# Patient Record
Sex: FEMALE | URBAN - METROPOLITAN AREA
[De-identification: names, ages, dates, MRNs, and addresses within clinical notes are randomized per-mention and may not be internally consistent; named-entity substitution may affect disease eponyms.]

---

## 2021-02-09 ENCOUNTER — APPOINTMENT (OUTPATIENT)
Dept: URBAN - METROPOLITAN AREA CLINIC 260 | Age: 46
Setting detail: DERMATOLOGY
End: 2021-02-11

## 2021-02-09 VITALS — HEIGHT: 64.5 IN | RESPIRATION RATE: 16 BRPM | WEIGHT: 122 LBS

## 2021-02-09 DIAGNOSIS — L71.0 PERIORAL DERMATITIS: ICD-10-CM

## 2021-02-09 PROBLEM — L30.9 DERMATITIS, UNSPECIFIED: Status: ACTIVE | Noted: 2021-02-09

## 2021-02-09 PROCEDURE — OTHER PRESCRIPTION: OTHER

## 2021-02-09 PROCEDURE — OTHER COUNSELING: OTHER

## 2021-02-09 PROCEDURE — 99203 OFFICE O/P NEW LOW 30 MIN: CPT

## 2021-02-09 PROCEDURE — OTHER PRESCRIPTION MEDICATION MANAGEMENT: OTHER

## 2021-02-09 RX ORDER — METRONIDAZOLE 7.5 MG/G
CREAM TOPICAL BID
Qty: 1 | Refills: 2 | Status: ERX | COMMUNITY
Start: 2021-02-09

## 2021-02-09 ASSESSMENT — LOCATION ZONE DERM: LOCATION ZONE: FACE

## 2021-02-09 ASSESSMENT — LOCATION SIMPLE DESCRIPTION DERM: LOCATION SIMPLE: RIGHT CHEEK

## 2021-02-09 ASSESSMENT — LOCATION DETAILED DESCRIPTION DERM: LOCATION DETAILED: RIGHT INFERIOR CENTRAL MALAR CHEEK

## 2021-02-09 NOTE — PROCEDURE: PRESCRIPTION MEDICATION MANAGEMENT
Initiate Treatment: Apply Metronidazole 0.75% cream twice daily to both cheeks for perioral dermatitis.
Render In Strict Bullet Format?: No
Plan: Continue using facial moisturizer and cleanser daily.
Detail Level: Zone

## 2021-03-16 ENCOUNTER — APPOINTMENT (OUTPATIENT)
Dept: URBAN - METROPOLITAN AREA CLINIC 260 | Age: 46
Setting detail: DERMATOLOGY
End: 2021-03-17

## 2021-03-16 VITALS — RESPIRATION RATE: 15 BRPM | WEIGHT: 121 LBS | HEIGHT: 64 IN

## 2021-03-16 DIAGNOSIS — L71.0 PERIORAL DERMATITIS: ICD-10-CM

## 2021-03-16 PROBLEM — L30.9 DERMATITIS, UNSPECIFIED: Status: ACTIVE | Noted: 2021-03-16

## 2021-03-16 PROCEDURE — OTHER PRESCRIPTION: OTHER

## 2021-03-16 PROCEDURE — OTHER PRESCRIPTION MEDICATION MANAGEMENT: OTHER

## 2021-03-16 PROCEDURE — OTHER COUNSELING: OTHER

## 2021-03-16 PROCEDURE — 99213 OFFICE O/P EST LOW 20 MIN: CPT

## 2021-03-16 RX ORDER — DESONIDE 0.5 MG/G
0.05 % CREAM TOPICAL BID
Qty: 1 | Refills: 1 | Status: ERX | COMMUNITY
Start: 2021-03-16

## 2021-03-16 ASSESSMENT — LOCATION DETAILED DESCRIPTION DERM: LOCATION DETAILED: RIGHT INFERIOR CENTRAL MALAR CHEEK

## 2021-03-16 ASSESSMENT — INVESTIGATOR STATIC GLOBAL ASSESSMENT
IN YOUR EXPERIENCE, AMONG ALL PATIENTS YOU HAVE SEEN WITH THIS CONDITION, HOW SEVERE IS THIS PATIENT'S CONDITION?: MILD TO MODERATE

## 2021-03-16 ASSESSMENT — LOCATION ZONE DERM: LOCATION ZONE: FACE

## 2021-03-16 ASSESSMENT — LOCATION SIMPLE DESCRIPTION DERM: LOCATION SIMPLE: RIGHT CHEEK

## 2021-03-16 NOTE — PROCEDURE: PRESCRIPTION MEDICATION MANAGEMENT
Render In Strict Bullet Format?: No
Detail Level: Zone
Continue Regimen: Metronidazole 0.75% cream twice daily to both cheeks for perioral dermatitis.
Plan: Continue using facial moisturizer and cleanser daily.
Initiate Treatment: Desonide 0.05 % topical cream

## 2021-03-16 NOTE — HPI: RASH
What Type Of Note Output Would You Prefer (Optional)?: Bullet Format
Is The Patient Presenting As Previously Scheduled?: Yes
How Severe Is Your Rash?: moderate
Is This A New Presentation, Or A Follow-Up?: Follow Up Rash
Additional History: Autumn feels that the rash improved when in [place] with less mask wearing.

## 2021-05-28 ENCOUNTER — RECORDS - HEALTHEAST (OUTPATIENT)
Dept: ADMINISTRATIVE | Facility: CLINIC | Age: 46
End: 2021-05-28

## 2021-05-29 ENCOUNTER — RECORDS - HEALTHEAST (OUTPATIENT)
Dept: ADMINISTRATIVE | Facility: CLINIC | Age: 46
End: 2021-05-29

## 2022-12-09 ENCOUNTER — HOSPITAL ENCOUNTER (EMERGENCY)
Facility: CLINIC | Age: 47
Discharge: HOME | End: 2022-12-09
Attending: EMERGENCY MEDICINE | Admitting: EMERGENCY MEDICINE
Payer: COMMERCIAL

## 2022-12-09 ENCOUNTER — APPOINTMENT (OUTPATIENT)
Dept: CT IMAGING | Facility: CLINIC | Age: 47
End: 2022-12-09
Attending: EMERGENCY MEDICINE
Payer: COMMERCIAL

## 2022-12-09 VITALS
RESPIRATION RATE: 16 BRPM | WEIGHT: 120 LBS | DIASTOLIC BLOOD PRESSURE: 56 MMHG | HEART RATE: 67 BPM | OXYGEN SATURATION: 100 % | TEMPERATURE: 98.1 F | SYSTOLIC BLOOD PRESSURE: 108 MMHG

## 2022-12-09 DIAGNOSIS — N23 URETERAL COLIC: ICD-10-CM

## 2022-12-09 DIAGNOSIS — N20.0 NEPHROLITHIASIS: ICD-10-CM

## 2022-12-09 LAB
ALBUMIN SERPL-MCNC: 4.3 G/DL (ref 3.5–5)
ALBUMIN UR-MCNC: NEGATIVE MG/DL
ALP SERPL-CCNC: 55 U/L (ref 45–120)
ALT SERPL W P-5'-P-CCNC: 13 U/L (ref 0–45)
ANION GAP SERPL CALCULATED.3IONS-SCNC: 10 MMOL/L (ref 5–18)
APPEARANCE UR: CLEAR
AST SERPL W P-5'-P-CCNC: 28 U/L (ref 0–40)
BASOPHILS # BLD AUTO: 0 10E3/UL (ref 0–0.2)
BASOPHILS NFR BLD AUTO: 0 %
BILIRUB SERPL-MCNC: 0.8 MG/DL (ref 0–1)
BILIRUB UR QL STRIP: NEGATIVE
BUN SERPL-MCNC: 12 MG/DL (ref 8–22)
CALCIUM SERPL-MCNC: 9.2 MG/DL (ref 8.5–10.5)
CHLORIDE BLD-SCNC: 106 MMOL/L (ref 98–107)
CO2 SERPL-SCNC: 25 MMOL/L (ref 22–31)
COLOR UR AUTO: ABNORMAL
CREAT SERPL-MCNC: 0.76 MG/DL (ref 0.6–1.1)
EOSINOPHIL # BLD AUTO: 0.2 10E3/UL (ref 0–0.7)
EOSINOPHIL NFR BLD AUTO: 2 %
ERYTHROCYTE [DISTWIDTH] IN BLOOD BY AUTOMATED COUNT: 11.9 % (ref 10–15)
GFR SERPL CREATININE-BSD FRML MDRD: >90 ML/MIN/1.73M2
GLUCOSE BLD-MCNC: 92 MG/DL (ref 70–125)
GLUCOSE UR STRIP-MCNC: NEGATIVE MG/DL
HCT VFR BLD AUTO: 40.2 % (ref 35–47)
HGB BLD-MCNC: 14.1 G/DL (ref 11.7–15.7)
HGB UR QL STRIP: ABNORMAL
IMM GRANULOCYTES # BLD: 0 10E3/UL
IMM GRANULOCYTES NFR BLD: 0 %
KETONES UR STRIP-MCNC: NEGATIVE MG/DL
LEUKOCYTE ESTERASE UR QL STRIP: NEGATIVE
LIPASE SERPL-CCNC: 36 U/L (ref 0–52)
LYMPHOCYTES # BLD AUTO: 1.8 10E3/UL (ref 0.8–5.3)
LYMPHOCYTES NFR BLD AUTO: 26 %
MCH RBC QN AUTO: 31.9 PG (ref 26.5–33)
MCHC RBC AUTO-ENTMCNC: 35.1 G/DL (ref 31.5–36.5)
MCV RBC AUTO: 91 FL (ref 78–100)
MONOCYTES # BLD AUTO: 0.4 10E3/UL (ref 0–1.3)
MONOCYTES NFR BLD AUTO: 6 %
MUCOUS THREADS #/AREA URNS LPF: PRESENT /LPF
NEUTROPHILS # BLD AUTO: 4.5 10E3/UL (ref 1.6–8.3)
NEUTROPHILS NFR BLD AUTO: 66 %
NITRATE UR QL: NEGATIVE
NRBC # BLD AUTO: 0 10E3/UL
NRBC BLD AUTO-RTO: 0 /100
PH UR STRIP: 6.5 [PH] (ref 5–7)
PLATELET # BLD AUTO: 354 10E3/UL (ref 150–450)
POTASSIUM BLD-SCNC: 4.4 MMOL/L (ref 3.5–5)
PROT SERPL-MCNC: 7.5 G/DL (ref 6–8)
RBC # BLD AUTO: 4.42 10E6/UL (ref 3.8–5.2)
RBC URINE: 19 /HPF
SODIUM SERPL-SCNC: 141 MMOL/L (ref 136–145)
SP GR UR STRIP: 1.01 (ref 1–1.03)
SQUAMOUS EPITHELIAL: <1 /HPF
UROBILINOGEN UR STRIP-MCNC: <2 MG/DL
WBC # BLD AUTO: 6.9 10E3/UL (ref 4–11)
WBC URINE: 2 /HPF

## 2022-12-09 PROCEDURE — 80053 COMPREHEN METABOLIC PANEL: CPT | Performed by: EMERGENCY MEDICINE

## 2022-12-09 PROCEDURE — 83690 ASSAY OF LIPASE: CPT | Performed by: EMERGENCY MEDICINE

## 2022-12-09 PROCEDURE — 85025 COMPLETE CBC W/AUTO DIFF WBC: CPT | Performed by: EMERGENCY MEDICINE

## 2022-12-09 PROCEDURE — 74176 CT ABD & PELVIS W/O CONTRAST: CPT

## 2022-12-09 PROCEDURE — 81001 URINALYSIS AUTO W/SCOPE: CPT | Performed by: EMERGENCY MEDICINE

## 2022-12-09 PROCEDURE — 36415 COLL VENOUS BLD VENIPUNCTURE: CPT | Performed by: EMERGENCY MEDICINE

## 2022-12-09 PROCEDURE — 99284 EMERGENCY DEPT VISIT MOD MDM: CPT | Mod: 25

## 2022-12-09 RX ORDER — ACETAMINOPHEN 500 MG
1000 TABLET ORAL EVERY 6 HOURS
Qty: 56 TABLET | Refills: 0 | Status: SHIPPED | OUTPATIENT
Start: 2022-12-09 | End: 2022-12-16

## 2022-12-09 RX ORDER — OXYCODONE HYDROCHLORIDE 5 MG/1
5 TABLET ORAL EVERY 4 HOURS PRN
Qty: 12 TABLET | Refills: 0 | Status: SHIPPED | OUTPATIENT
Start: 2022-12-09 | End: 2022-12-13

## 2022-12-09 RX ORDER — IBUPROFEN 200 MG
400 TABLET ORAL EVERY 6 HOURS
Qty: 56 TABLET | Refills: 0 | Status: SHIPPED | OUTPATIENT
Start: 2022-12-09 | End: 2022-12-16

## 2022-12-09 RX ORDER — DIMENHYDRINATE 50 MG
50 TABLET ORAL AT BEDTIME
Qty: 7 TABLET | Refills: 0 | Status: SHIPPED | OUTPATIENT
Start: 2022-12-09 | End: 2022-12-16

## 2022-12-09 RX ORDER — DIMENHYDRINATE 50 MG
50 TABLET ORAL EVERY 6 HOURS PRN
Qty: 28 TABLET | Refills: 0 | Status: SHIPPED | OUTPATIENT
Start: 2022-12-09 | End: 2022-12-16

## 2022-12-09 ASSESSMENT — ACTIVITIES OF DAILY LIVING (ADL): ADLS_ACUITY_SCORE: 33

## 2022-12-09 NOTE — ED TRIAGE NOTES
abd pain started this am. Pain is lower mid and lower right quad. States is nauseated. Denies emesis. Last bm at 1500, unremarkable. States starts to have pain with urination that is sharp. States had prior kidney stone but does not recall the pain. Denies back pain.

## 2022-12-09 NOTE — ED PROVIDER NOTES
EMERGENCY DEPARTMENT ENCOUnter      NAME: Thalia Camarillo  AGE: 47 year old female  YOB: 1975  MRN: [id number]  EVALUATION DATE & TIME: No admission date for patient encounter.    PCP: No primary care provider on file.    ED PROVIDER: Nevaeh Regalado MD      Chief Complaint   Patient presents with     Abdominal Pain         FINAL IMPRESSION:  1. Ureteral colic    2. Nephrolithiasis          ED COURSE & MEDICAL DECISION MAKING:      In summary, the patient is a 47-year-old female that presents to the emergency department for evaluation of abdominal pain thought secondary to ureteral colic.  Here she has passed the stone into her bladder.  We will treat symptomatically as an outpatient with close outpatient follow-up with the kidney stone [place].  2:16 AM I met with patient for initial interview and encounter. PPE worn includes N95 mask, and exam gloves.  Offered pain medication which the patient declined.  3:51 AM I updated the patient about lab and imaging results. We discussed plans for discharge.     At the conclusion of the encounter I discussed the results of all of the tests and the disposition. The questions were answered. The patient or family acknowledged understanding and was agreeable with the care plan.         MEDICATIONS GIVEN IN THE EMERGENCY:  Medications - No data to display    NEW PRESCRIPTIONS STARTED AT TODAY'S ER VISIT  New Prescriptions    ACETAMINOPHEN (TYLENOL) 500 MG TABLET    Take 2 tablets (1,000 mg) by mouth every 6 hours for 7 days    DIMENHYDRINATE (DRAMAMINE) 50 MG TABLET    Take 1 tablet (50 mg) by mouth At Bedtime for 7 days    DIMENHYDRINATE (DRAMAMINE) 50 MG TABLET    Take 1 tablet (50 mg) by mouth every 6 hours as needed for other (kidney stone pain management)    IBUPROFEN (ADVIL/MOTRIN) 200 MG TABLET    Take 2 tablets (400 mg) by mouth every 6 hours for 7 days    OXYCODONE (ROXICODONE) 5 MG TABLET    Take 1 tablet (5 mg) by mouth every 4 hours as needed for  severe pain (7-10) If pain is not improved with acetaminophen and ibuprofen.          =================================================================    HPI        Thalia Camarillo is a 47 year old female with a pertinent history of abdominal bloating, gastroesophageal reflux with diseases w/o esophagitis, irritable bowel syndrome, recurrent IUD, TMJ pain dysfunction syndrome, and IUD in place, who presents to this ED via walk in for evaluation of abdominal pain.     The patient present with dull lower abdominal pain since yesterday morning at 8 AM (~18 hours ago). She rates severity of abdominal pain 6/10. Pain is worse when sitting up straight. She reports abdominal pain is uncomfortable and makes her hard to concentrate.  She never had to experience pain that she couldn't sleep through. The patient reports having pain with urination that is sharp. Currently feels sharp in groin area. She is nausea but denies any emesis. Her menstrual cycles are usually just one drop dur to IUD in place. Last bowel movement was at 2 PM (~12 hours ago). Drinks alcohol 4-5 nights per week. The patient appendix is still intact. Denies possibility of pregnancy. Denies smoking. The patient denies diarrhea, constipation, urination difficulty, dysuria, frequency, and any other complaints or concerns at the moment.     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       REVIEW OF SYSTEMS     Constitutional:  Denies fever or chills  HENT:  Denies sore throat   Respiratory:  Denies cough or shortness of breath   Cardiovascular:  Denies chest pain or palpitations  GI:  Denies vomiting, constipation, or diarrhea. Positive for lower abdominal pain and nausea. Positive for sharp urination.   : Denies urination difficulty, dysuria, hematuria, or frequency. Positive for sharp urination.   Musculoskeletal:  Denies any new extremity pain   Skin:  Denies rash   Neurologic:  Denies headache, focal weakness or sensory changes    All other systems reviewed  and are negative      PAST MEDICAL HISTORY:  gerd  PAST SURGICAL HISTORY:  History reviewed. No pertinent surgical history.        CURRENT MEDICATIONS:    acetaminophen (TYLENOL) 500 MG tablet  dimenhyDRINATE (DRAMAMINE) 50 MG tablet  dimenhyDRINATE (DRAMAMINE) 50 MG tablet  ibuprofen (ADVIL/MOTRIN) 200 MG tablet  oxyCODONE (ROXICODONE) 5 MG tablet        ALLERGIES:  No Known Allergies    FAMILY HISTORY:  No family history on file.    SOCIAL HISTORY:   Social History     Socioeconomic History     Marital status:      Spouse name: None     Number of children: None     Years of education: None     Highest education level: None       VITALS:  Patient Vitals for the past 24 hrs:   BP Temp Temp src Pulse Resp SpO2 Weight   12/09/22 0214 113/80 98.1  F (36.7  C) Oral 89 12 100 % 54.4 kg (120 lb)       PHYSICAL EXAM    Constitutional:  Well developed, Well nourished,  HENT:  Normocephalic, Atraumatic, Bilateral external ears normal, Oropharynx moist, Nose normal.   Neck:  Normal range of motion, No meningismus, No stridor.   Eyes:  EOMI, Conjunctiva normal, No discharge.   Respiratory:  Normal breath sounds, No respiratory distress, No wheezing, No chest tenderness.   Cardiovascular:  Normal heart rate, Normal rhythm, No murmurs  GI:  Soft, minimal rlq tenderness, No guarding, No CVA tenderness.   Musculoskeletal:  Neurovascularly intact distally, No edema, No tenderness, No cyanosis, Good range of motion in all major joints.   Integument:  Warm, Dry, No erythema, No rash.   Lymphatic:  No lymphadenopathy noted.   Neurologic:  Alert & oriented x 3, Normal motor function,  No focal deficits noted.   Psychiatric:  Affect normal, Judgment normal, Mood normal.      LAB:  All pertinent labs reviewed and interpreted.  Results for orders placed or performed during the hospital encounter of 12/09/22   CT Abdomen Pelvis w/o Contrast    Impression    IMPRESSION:   1.  Tiny punctate calyceal tip stones in the collecting system  of both kidneys. Mild right hydronephrosis and hydroureter to the level of the urinary bladder containing a tiny dependent stone, likely recently passed. No obstruction on the left.    2.  Formed stool material within normal caliber colon. No obstruction or free gas. Normal appendix.    3.  Retroverted uterus containing an IUCD, well seated. Dependent pelvic physiologic free fluid.    4.  Mild left convex lumbar curve.     Comprehensive metabolic panel   Result Value Ref Range    Sodium 141 136 - 145 mmol/L    Potassium 4.4 3.5 - 5.0 mmol/L    Chloride 106 98 - 107 mmol/L    Carbon Dioxide (CO2) 25 22 - 31 mmol/L    Anion Gap 10 5 - 18 mmol/L    Urea Nitrogen 12 8 - 22 mg/dL    Creatinine 0.76 0.60 - 1.10 mg/dL    Calcium 9.2 8.5 - 10.5 mg/dL    Glucose 92 70 - 125 mg/dL    Alkaline Phosphatase 55 45 - 120 U/L    AST 28 0 - 40 U/L    ALT 13 0 - 45 U/L    Protein Total 7.5 6.0 - 8.0 g/dL    Albumin 4.3 3.5 - 5.0 g/dL    Bilirubin Total 0.8 0.0 - 1.0 mg/dL    GFR Estimate >90 >60 mL/min/1.73m2   Result Value Ref Range    Lipase 36 0 - 52 U/L   UA with Microscopic reflex to Culture    Specimen: Urine, Clean Catch   Result Value Ref Range    Color Urine Light Yellow Colorless, Straw, Light Yellow, Yellow    Appearance Urine Clear Clear    Glucose Urine Negative Negative mg/dL    Bilirubin Urine Negative Negative    Ketones Urine Negative Negative mg/dL    Specific Gravity Urine 1.010 1.001 - 1.030    Blood Urine >1.0 mg/dL (A) Negative    pH Urine 6.5 5.0 - 7.0    Protein Albumin Urine Negative Negative mg/dL    Urobilinogen Urine <2.0 <2.0 mg/dL    Nitrite Urine Negative Negative    Leukocyte Esterase Urine Negative Negative    Mucus Urine Present (A) None Seen /LPF    RBC Urine 19 (H) <=2 /HPF    WBC Urine 2 <=5 /HPF    Squamous Epithelials Urine <1 <=1 /HPF   CBC with platelets and differential   Result Value Ref Range    WBC Count 6.9 4.0 - 11.0 10e3/uL    RBC Count 4.42 3.80 - 5.20 10e6/uL    Hemoglobin 14.1 11.7 -  15.7 g/dL    Hematocrit 40.2 35.0 - 47.0 %    MCV 91 78 - 100 fL    MCH 31.9 26.5 - 33.0 pg    MCHC 35.1 31.5 - 36.5 g/dL    RDW 11.9 10.0 - 15.0 %    Platelet Count 354 150 - 450 10e3/uL    % Neutrophils 66 %    % Lymphocytes 26 %    % Monocytes 6 %    % Eosinophils 2 %    % Basophils 0 %    % Immature Granulocytes 0 %    NRBCs per 100 WBC 0 <1 /100    Absolute Neutrophils 4.5 1.6 - 8.3 10e3/uL    Absolute Lymphocytes 1.8 0.8 - 5.3 10e3/uL    Absolute Monocytes 0.4 0.0 - 1.3 10e3/uL    Absolute Eosinophils 0.2 0.0 - 0.7 10e3/uL    Absolute Basophils 0.0 0.0 - 0.2 10e3/uL    Absolute Immature Granulocytes 0.0 <=0.4 10e3/uL    Absolute NRBCs 0.0 10e3/uL       RADIOLOGY:  I have independently reviewed and interpreted the above imaging, pending the final radiology read.  CT Abdomen Pelvis w/o Contrast   Final Result   IMPRESSION:    1.  Tiny punctate calyceal tip stones in the collecting system of both kidneys. Mild right hydronephrosis and hydroureter to the level of the urinary bladder containing a tiny dependent stone, likely recently passed. No obstruction on the left.      2.  Formed stool material within normal caliber colon. No obstruction or free gas. Normal appendix.      3.  Retroverted uterus containing an IUCD, well seated. Dependent pelvic physiologic free fluid.      4.  Mild left convex lumbar curve.                     I, Rebecca Austin, am serving as a scribe to document services personally performed by Dr. Regalado based on my observation and the provider's statements to me. I, Nevaeh Regalado MD attest that Rebecca Austin is acting in a scribe capacity, has observed my performance of the services and has documented them in accordance with my direction.    Nevaeh Regalado MD  Emergency Medicine  [facility] EMERGENCY ROOM  [address] [id number] [phone]  Dept: [phone]       Nevaeh Regalado MD  12/09/22  [id number]

## 2022-12-13 ENCOUNTER — VIRTUAL VISIT (OUTPATIENT)
Dept: UROLOGY | Facility: CLINIC | Age: 47
End: 2022-12-13
Attending: EMERGENCY MEDICINE
Payer: COMMERCIAL

## 2022-12-13 DIAGNOSIS — N23 URETERAL COLIC: ICD-10-CM

## 2022-12-13 DIAGNOSIS — N20.0 NEPHROLITHIASIS: ICD-10-CM

## 2022-12-13 DIAGNOSIS — N20.0 CALCULUS OF KIDNEY: Primary | ICD-10-CM

## 2022-12-13 PROCEDURE — 99204 OFFICE O/P NEW MOD 45 MIN: CPT | Mod: 95 | Performed by: UROLOGY

## 2022-12-13 RX ORDER — SULFAMETHOXAZOLE AND TRIMETHOPRIM 400; 80 MG/1; MG/1
TABLET ORAL
COMMUNITY
Start: 2021-10-28

## 2022-12-13 RX ORDER — MULTIPLE VITAMINS W/ MINERALS TAB 9MG-400MCG
1 TAB ORAL DAILY
COMMUNITY

## 2022-12-13 ASSESSMENT — PAIN SCALES - GENERAL: PAINLEVEL: NO PAIN (0)

## 2022-12-13 NOTE — PROGRESS NOTES
Assessment/Plan:    Assessment & Plan   Thalia was seen today for new patient.    Diagnoses and all orders for this visit:    Calculus of kidney  -     Renal Function Panel ([facility])  -     CALCIUM, IONIZED, SERUM  -     Stone formation, timed urine; Standing  -     Patient Stated Goal: Prevent further stones  -     Parathyroid Hormone Intact; Future    Ureteral colic  -     Adult Urology Referral    Nephrolithiasis  -     Adult Urology Referral        Stone Management Plan  Stone Management 12/13/2022   Urinary Tract Infection No suspicion of infection   Renal Colic Asymptomatic at this time   Renal Failure No suspicion of renal failure   Current CT date 12/9/2022   Right sided stones? Yes   R Number of ureteral stones No ureteral stones   R Number of kidney stones  2   R GSD of kidney stones 2 - 4   R Hydronephrosis None   R Stone Event New stone passed prior to visit   Diagnosis date 12/9/2022   Initial location of primary symptomatic stone Distal   Initial GSD of primary symptomatic stone 3   Resolved Date 12/9/2022   Left sided stones? Yes   L Number of ureteral stones No ureteral stones   L Number of kidney stones  5   L GSD of kidney stones 2 - 4   L Hydronephrosis None   L Stone Event No current event             PLAN    Will initiate comprehensive stone risk evaluation. Serum stone risk chemistries including parathyroid hormone and ionized calcium will be obtained before next visit. Two 24 hour urine collections and dietary journal will be obtained after waiting at least two weeks.. Patient verbalized understanding. Patient agrees with plan as discussed. She will return to clinic when labs are available.      Video call duration: 10 minutes  Distant site (provider site): Remote  15 minutes spent on the date of the encounter doing chart review, history and exam, documentation and further activities per the note    GINNY DELVALLE MD  [facility]    Subjective:  "    HPI  Ms. Thalia Camarillo is a 47 year old  female who is being evaluated via a billable video visit by [facility] new stone episode.    She is a first time  stone former who has not required stone clearance procedures. She has not previously participated in stone risk evaluation. She has no identified modifiable stone risk factors. She has identified non-modifiable stone risks including:  multiple stones at presentation and bilateral stones.    Presented to ED with sudden onset right flank pain. Was found to have passed a stone into the bladder. Minimal subsequent symptoms. She has not caught the stone. In the recent past, she was regularly consuming spinach \"smoothies\" as a health effort. Father has had a single stone.    CT scan is personally reviewed and demonstrates 2 small peripheral righr renal stones and 5 on the left. There is a 3 mm stone in the bladder.    Significant labs from presentation sare listed below.    ROS   Review of systems is negative except for HPI    No past medical history on file.    No past surgical history on file.    Current Outpatient Medications   Medication Sig Dispense Refill     dimenhyDRINATE (DRAMAMINE) 50 MG tablet Take 1 tablet (50 mg) by mouth At Bedtime for 7 days 7 tablet 0     dimenhyDRINATE (DRAMAMINE) 50 MG tablet Take 1 tablet (50 mg) by mouth every 6 hours as needed for other (kidney stone pain management) 28 tablet 0     ibuprofen (ADVIL/MOTRIN) 200 MG tablet Take 2 tablets (400 mg) by mouth every 6 hours for 7 days 56 tablet 0     multivitamin w/minerals (MULTI-VITAMIN) tablet Take 1 tablet by mouth daily       sulfamethoxazole-trimethoprim (BACTRIM) 400-80 MG tablet TAKE 1 TABLET BY MOUTH EVERY 12 HOURS FOR TWO DOSES AFTER INTERCOURSE AS NEEDED       acetaminophen (TYLENOL) 500 MG tablet Take 2 tablets (1,000 mg) by mouth every 6 hours for 7 days 56 tablet 0     oxyCODONE (ROXICODONE) 5 MG tablet Take 1 tablet (5 mg) by mouth " every 4 hours as needed for severe pain (7-10) If pain is not improved with acetaminophen and ibuprofen. 12 tablet 0       No Known Allergies    Social History     Socioeconomic History     Marital status:      Spouse name: Not on file     Number of children: Not on file     Years of education: Not on file     Highest education level: Not on file   Occupational History     Not on file   Tobacco Use     Smoking status: Not on file     Smokeless tobacco: Not on file   Substance and Sexual Activity     Alcohol use: Not on file     Drug use: Not on file     Sexual activity: Not on file   Other Topics Concern     Not on file   Social History Narrative     Not on file     Social Determinants of Health     Financial Resource Strain: Not on file   Food Insecurity: Not on file   Transportation Needs: Not on file   Physical Activity: Not on file   Stress: Not on file   Social Connections: Not on file   Intimate Partner Violence: Not on file   Housing Stability: Not on file       No family history on file.    Objective:     No vitals or physical exam obtained due to virtual visit    LABS  Most Recent 3 CBC's:Recent Labs   Lab Test 12/09/22  0257   WBC 6.9   HGB 14.1   MCV 91        Most Recent 3 BMP's:Recent Labs   Lab Test 12/09/22  0257      POTASSIUM 4.4   CHLORIDE 106   CO2 25   BUN 12   CR 0.76   ANIONGAP 10   HARRY 9.2   GLC 92     Most Recent Urinalysis:Recent Labs   Lab Test 12/09/22  0318   COLOR Light Yellow   APPEARANCE Clear   URINEGLC Negative   URINEBILI Negative   URINEKETONE Negative   SG 1.010   UBLD >1.0 mg/dL*   URINEPH 6.5   PROTEIN Negative   NITRITE Negative   LEUKEST Negative   RBCU 19*   WBCU 2     Acute Labs Urine Culture  No results found for: CULTURE

## 2022-12-13 NOTE — PROGRESS NOTES
Patient is roomed via telephone for a virtual visit.  Patient confirmed she is in the [facility] at the time of this appointment.  Patient understands that this visit is billable and agree to proceed with appointment.

## 2022-12-13 NOTE — PATIENT INSTRUCTIONS
Patient Stated Goal: Prevent further stones  Steps for collecting a 24 hour urine specimen    Please follow the directions carefully. All urine voided for a 24-hour period needs to be collected into the jug.  DO NOT change any of your  normal  daily habits when doing this test. Continue to follow your regular diet, intake of fluids, and usual activity level. Pick the most convenient day with your schedule, perhaps on a weekend or a day off.    Start your Diet Log the day before collection and continue on the day of urine collection.  You MUST bring Diet Log with you on follow up visit to discuss results.  One 24hr Urine Collection   Two 24hr Urine Collections  (do not collect on consecutive days)    PLEASE COMPLETE THE 2nd JUG WITHIN 1-2 WEEKS FROM THE 1st JUG    STEP 1  Empty your bladder completely into the toilet. This will be your start time. Write your full legal name, start date and time on the jug label.  Collection start and stop times need to match exactly!  For example:  6 am to 6 am.    STEP 2  The next time you urinate, empty your bladder directly into the jug or collection hat and pour urine into the jug.  Screw the lid back onto the jug.  Do not spill!    STEP 3  Place the jug in the refrigerator or a cooler with ice during the collection period.  Failure to keep it cool could cause inaccurate test results. DO NOT Freeze.    STEP 4  Continue collecting all urine into the jug for the rest of the day, for the full 24 hours.  DO NOT stop early or go over 24 hours!    STEP 5  Exactly 24 hours from start of collection, write your full legal name, stop date and time on the jug label.   Collection start and stop times need to match exactly!  For example:  6 am to 6 am.  Failure to label correctly will result in recollection of urine specimen.    STEP 6  Return each jug within 24 hours after final urination.     STEP 7  Drop off jug locations:     STEP 8  Please call [facility] after return of your final jug to  schedule your follow-up visit. [phone]

## 2022-12-14 ENCOUNTER — TELEPHONE (OUTPATIENT)
Dept: UROLOGY | Facility: CLINIC | Age: 47
End: 2022-12-14

## 2023-11-17 ENCOUNTER — HOSPITAL ENCOUNTER (OUTPATIENT)
Dept: CT IMAGING | Facility: CLINIC | Age: 48
Discharge: HOME | End: 2023-11-17
Payer: COMMERCIAL

## 2023-11-17 DIAGNOSIS — R10.84 ABDOMINAL PAIN, GENERALIZED: ICD-10-CM

## 2023-11-17 PROCEDURE — 74177 CT ABD & PELVIS W/CONTRAST: CPT

## 2023-11-17 PROCEDURE — 250N000011 HC RX IP 250 OP 636

## 2023-11-17 RX ORDER — IOPAMIDOL 755 MG/ML
75 INJECTION, SOLUTION INTRAVASCULAR ONCE
Status: COMPLETED | OUTPATIENT
Start: 2023-11-17 | End: 2023-11-17

## 2023-11-17 RX ADMIN — IOPAMIDOL 75 ML: 755 INJECTION, SOLUTION INTRAVENOUS at 16:48

## 2024-09-11 ENCOUNTER — APPOINTMENT (OUTPATIENT)
Dept: URBAN - METROPOLITAN AREA CLINIC 260 | Age: 49
Setting detail: DERMATOLOGY
End: 2024-09-11

## 2024-09-11 VITALS — WEIGHT: 120 LBS | HEIGHT: 64 IN

## 2024-09-11 DIAGNOSIS — L30.9 DERMATITIS, UNSPECIFIED: ICD-10-CM

## 2024-09-11 DIAGNOSIS — B373 CANDIDIASIS OF VULVA AND VAGINA: ICD-10-CM

## 2024-09-11 PROBLEM — B37.31 ACUTE CANDIDIASIS OF VULVA AND VAGINA: Status: ACTIVE | Noted: 2024-09-11

## 2024-09-11 PROCEDURE — OTHER BIOPSY BY SHAVE METHOD: OTHER

## 2024-09-11 PROCEDURE — OTHER COUNSELING: OTHER

## 2024-09-11 PROCEDURE — 99213 OFFICE O/P EST LOW 20 MIN: CPT | Mod: 25

## 2024-09-11 PROCEDURE — OTHER PHOTO-DOCUMENTATION: OTHER

## 2024-09-11 PROCEDURE — OTHER PRESCRIPTION MEDICATION MANAGEMENT: OTHER

## 2024-09-11 PROCEDURE — OTHER MIPS QUALITY: OTHER

## 2024-09-11 PROCEDURE — OTHER PRESCRIPTION: OTHER

## 2024-09-11 PROCEDURE — 11102 TANGNTL BX SKIN SINGLE LES: CPT

## 2024-09-11 RX ORDER — CLOBETASOL PROPIONATE 0.5 MG/G
CREAM TOPICAL
Qty: 60 | Refills: 3 | Status: ERX | COMMUNITY
Start: 2024-09-11

## 2024-09-11 RX ORDER — FLUCONAZOLE 150 MG/1
150 MG TABLET ORAL QD
Qty: 2 | Refills: 2 | Status: ERX | COMMUNITY
Start: 2024-09-11

## 2024-09-11 ASSESSMENT — LOCATION ZONE DERM: LOCATION ZONE: LEG

## 2024-09-11 ASSESSMENT — LOCATION SIMPLE DESCRIPTION DERM
LOCATION SIMPLE: LEFT PRETIBIAL REGION
LOCATION SIMPLE: RIGHT KNEE
LOCATION SIMPLE: LEFT KNEE

## 2024-09-11 ASSESSMENT — LOCATION DETAILED DESCRIPTION DERM
LOCATION DETAILED: RIGHT KNEE
LOCATION DETAILED: LEFT DISTAL PRETIBIAL REGION
LOCATION DETAILED: LEFT LATERAL DISTAL PRETIBIAL REGION
LOCATION DETAILED: LEFT PROXIMAL PRETIBIAL REGION
LOCATION DETAILED: LEFT MEDIAL KNEE

## 2024-09-11 NOTE — PROCEDURE: BIOPSY BY SHAVE METHOD
Detail Level: Detailed
Depth Of Biopsy: dermis
Was A Bandage Applied: Yes
Size Of Lesion In Cm: 0
Biopsy Type: H and E
Biopsy Method: double edge Personna blade
Anesthesia Type: 1% lidocaine with epinephrine and a 1:10 solution of 8.4% sodium bicarbonate
Anesthesia Volume In Cc: 0.5
Hemostasis: Drysol
Wound Care: Petrolatum
Dressing: bandage
Destruction After The Procedure: No
Type Of Destruction Used: Curettage
Curettage Text: The wound bed was treated with curettage after the biopsy was performed.
Cryotherapy Text: The wound bed was treated with cryotherapy after the biopsy was performed.
Electrodesiccation Text: The wound bed was treated with electrodesiccation after the biopsy was performed.
Electrodesiccation And Curettage Text: The wound bed was treated with electrodesiccation and curettage after the biopsy was performed.
Silver Nitrate Text: The wound bed was treated with silver nitrate after the biopsy was performed.
Lab: -[id number]
Consent: Written consent was obtained and risks were reviewed including but not limited to scarring, infection, bleeding, scabbing, incomplete removal, nerve damage and allergy to anesthesia.
Post-Care Instructions: I reviewed with the patient in detail post-care instructions. Patient is to keep the biopsy site dry overnight, and then apply bacitracin twice daily until healed. Patient may apply hydrogen peroxide soaks to remove any crusting.
Notification Instructions: Patient will be notified of biopsy results. However, patient instructed to call the office if not contacted within 2 weeks.
Billing Type: Third-Party Bill
Information: Selecting Yes will display possible errors in your note based on the variables you have selected. This validation is only offered as a suggestion for you. PLEASE NOTE THAT THE VALIDATION TEXT WILL BE REMOVED WHEN YOU FINALIZE YOUR NOTE. IF YOU WANT TO FAX A PRELIMINARY NOTE YOU WILL NEED TO TOGGLE THIS TO 'NO' IF YOU DO NOT WANT IT IN YOUR FAXED NOTE.

## 2024-09-11 NOTE — PROCEDURE: COUNSELING
Detail Level: Zone
Detail Level: Detailed
Patient Specific Counseling (Will Not Stick From Patient To Patient): **\\nPatient states she has a history of yeast infections from antibiotics. Prescription of Fluconazole was sent to pharmacy today.

## 2024-09-11 NOTE — PROCEDURE: PRESCRIPTION MEDICATION MANAGEMENT
Initiate Treatment: Clobetasol 0.05 % topical cream. Apply to affected areas on legs twice daily for up to 3 weeks. Take 1 week off before restarting if needed
Detail Level: Simple
Render In Strict Bullet Format?: No
Initiate Treatment: Fluconazole 150 mg.  Take 1 tablet, then repeat in 72 hours

## 2024-09-26 ENCOUNTER — APPOINTMENT (OUTPATIENT)
Dept: URBAN - METROPOLITAN AREA CLINIC 260 | Age: 49
Setting detail: DERMATOLOGY
End: 2024-09-27

## 2024-09-26 DIAGNOSIS — Z48.02 ENCOUNTER FOR REMOVAL OF SUTURES: ICD-10-CM

## 2024-09-26 DIAGNOSIS — L20.89 OTHER ATOPIC DERMATITIS: ICD-10-CM

## 2024-09-26 PROCEDURE — 99213 OFFICE O/P EST LOW 20 MIN: CPT

## 2024-09-26 PROCEDURE — OTHER COUNSELING: OTHER

## 2024-09-26 PROCEDURE — OTHER MIPS QUALITY: OTHER

## 2024-09-26 PROCEDURE — OTHER SUTURE REMOVAL (GLOBAL PERIOD): OTHER

## 2024-09-26 PROCEDURE — OTHER PRESCRIPTION MEDICATION MANAGEMENT: OTHER

## 2024-09-26 ASSESSMENT — LOCATION DETAILED DESCRIPTION DERM: LOCATION DETAILED: LEFT KNEE

## 2024-09-26 ASSESSMENT — LOCATION SIMPLE DESCRIPTION DERM: LOCATION SIMPLE: LEFT KNEE

## 2024-09-26 ASSESSMENT — LOCATION ZONE DERM: LOCATION ZONE: LEG

## 2024-09-26 NOTE — PROCEDURE: PRESCRIPTION MEDICATION MANAGEMENT
Render In Strict Bullet Format?: No
Continue Regimen: Clobetasol .05% cream. Apply to affected areas twice daily for up to 3 weeks. Take 1 week off before restarting if needed
Detail Level: Simple

## 2024-09-26 NOTE — PROCEDURE: COUNSELING
Detail Level: Detailed
Patient Specific Counseling (Will Not Stick From Patient To Patient): **\\nReviewed pathology report with patient.

## 2024-09-26 NOTE — PROCEDURE: SUTURE REMOVAL (GLOBAL PERIOD)
Detail Level: Detailed
Add 74265 Cpt? (Important Note: In 2017 The Use Of 79116 Is Being Tracked By Cms To Determine Future Global Period Reimbursement For Global Periods): no

## 2025-01-02 ENCOUNTER — APPOINTMENT (OUTPATIENT)
Dept: URBAN - METROPOLITAN AREA CLINIC 260 | Age: 50
Setting detail: DERMATOLOGY
End: 2025-01-02

## 2025-01-02 VITALS — HEIGHT: 64 IN | WEIGHT: 120 LBS

## 2025-01-02 DIAGNOSIS — L20.89 OTHER ATOPIC DERMATITIS: ICD-10-CM

## 2025-01-02 DIAGNOSIS — L72.8 OTHER FOLLICULAR CYSTS OF THE SKIN AND SUBCUTANEOUS TISSUE: ICD-10-CM

## 2025-01-02 DIAGNOSIS — L71.8 OTHER ROSACEA: ICD-10-CM

## 2025-01-02 PROCEDURE — OTHER PRESCRIPTION MEDICATION MANAGEMENT: OTHER

## 2025-01-02 PROCEDURE — 99214 OFFICE O/P EST MOD 30 MIN: CPT | Mod: 25

## 2025-01-02 PROCEDURE — OTHER COUNSELING: OTHER

## 2025-01-02 PROCEDURE — OTHER PRESCRIPTION: OTHER

## 2025-01-02 PROCEDURE — OTHER INTRALESIONAL KENALOG: OTHER

## 2025-01-02 PROCEDURE — OTHER MIPS QUALITY: OTHER

## 2025-01-02 PROCEDURE — 11900 INJECT SKIN LESIONS </W 7: CPT

## 2025-01-02 RX ORDER — METRONIDAZOLE 7.5 MG/G
0.75% CREAM TOPICAL BID
Qty: 45 | Refills: 6 | Status: ERX | COMMUNITY
Start: 2025-01-02

## 2025-01-02 ASSESSMENT — LOCATION DETAILED DESCRIPTION DERM
LOCATION DETAILED: LEFT ANTERIOR DISTAL THIGH
LOCATION DETAILED: RIGHT ANTERIOR DISTAL THIGH
LOCATION DETAILED: LEFT INFERIOR CENTRAL MALAR CHEEK
LOCATION DETAILED: LEFT PROXIMAL PRETIBIAL REGION
LOCATION DETAILED: LEFT UPPER CUTANEOUS LIP
LOCATION DETAILED: RIGHT INFERIOR CENTRAL MALAR CHEEK
LOCATION DETAILED: RIGHT PROXIMAL PRETIBIAL REGION

## 2025-01-02 ASSESSMENT — LOCATION SIMPLE DESCRIPTION DERM
LOCATION SIMPLE: LEFT CHEEK
LOCATION SIMPLE: LEFT LIP
LOCATION SIMPLE: LEFT THIGH
LOCATION SIMPLE: RIGHT CHEEK
LOCATION SIMPLE: LEFT PRETIBIAL REGION
LOCATION SIMPLE: RIGHT PRETIBIAL REGION
LOCATION SIMPLE: RIGHT THIGH

## 2025-01-02 ASSESSMENT — LOCATION ZONE DERM
LOCATION ZONE: LIP
LOCATION ZONE: LEG
LOCATION ZONE: FACE

## 2025-01-02 NOTE — PROCEDURE: INTRALESIONAL KENALOG
How Many Mls Were Removed From The 40 Mg/Ml (5ml) Vial When Preparing The Injectable Solution?: 0
Detail Level: Detailed
Medical Necessity Clause: This procedure was medically necessary because the lesions that were treated were:
Administered By (Optional): Meron Yee PA-C
Total Volume (Ccs): .025
Validate Note Data When Using Inventory: Yes
Kenalog Type Of Vial: Multiple Dose
Lot # For Kenalog (Optional): [id number]
Show Inventory Tab: Hide
Consent: The risks of atrophy were reviewed with the patient.
Bill For Wasted Drug (Kenalog)?: no
Concentration Of Kenalog Solution Injected (Mg/Ml): 2.5
Kenalog Preparation: Kenalog
Expiration Date For Kenalog (Optional): March 2026

## 2025-01-02 NOTE — HPI: RASH (ROSACEA)
How Severe Is Your Rosacea?: mild
Is This A New Presentation, Or A Follow-Up?: Rosacea
Additional History: Patient reports she has had rosacea for a few years. She notes it normally flares at night and she has been prescribed a gel for the redness which slightly improved her symptoms.

## 2025-01-02 NOTE — PROCEDURE: PRESCRIPTION MEDICATION MANAGEMENT
Render In Strict Bullet Format?: No
Discontinue Regimen: Clobetasol .05% cream for now. May resume as needed for flares
Detail Level: Simple
Plan: Follow up in 3 months.
Initiate Treatment: Metronidazole 0.75 % topical cream. Apply to entire face twice daily\\n

## 2025-02-26 ENCOUNTER — APPOINTMENT (OUTPATIENT)
Dept: URBAN - METROPOLITAN AREA CLINIC 260 | Age: 50
Setting detail: DERMATOLOGY
End: 2025-02-26

## 2025-02-26 VITALS — WEIGHT: 112 LBS | HEIGHT: 64 IN | RESPIRATION RATE: 14 BRPM

## 2025-02-26 DIAGNOSIS — S00522A BLISTER OF FACE, NECK, AND SCALP EXCEPT EYE, WITHOUT MENTION OF INFECTION: ICD-10-CM

## 2025-02-26 DIAGNOSIS — S0002XA BLISTER OF FACE, NECK, AND SCALP EXCEPT EYE, WITHOUT MENTION OF INFECTION: ICD-10-CM

## 2025-02-26 DIAGNOSIS — S00429A BLISTER OF FACE, NECK, AND SCALP EXCEPT EYE, WITHOUT MENTION OF INFECTION: ICD-10-CM

## 2025-02-26 DIAGNOSIS — S0032XA BLISTER OF FACE, NECK, AND SCALP EXCEPT EYE, WITHOUT MENTION OF INFECTION: ICD-10-CM

## 2025-02-26 DIAGNOSIS — S1012XA BLISTER OF FACE, NECK, AND SCALP EXCEPT EYE, WITHOUT MENTION OF INFECTION: ICD-10-CM

## 2025-02-26 DIAGNOSIS — S0092XA BLISTER OF FACE, NECK, AND SCALP EXCEPT EYE, WITHOUT MENTION OF INFECTION: ICD-10-CM

## 2025-02-26 DIAGNOSIS — S1092XA BLISTER OF FACE, NECK, AND SCALP EXCEPT EYE, WITHOUT MENTION OF INFECTION: ICD-10-CM

## 2025-02-26 DIAGNOSIS — S00521A BLISTER OF FACE, NECK, AND SCALP EXCEPT EYE, WITHOUT MENTION OF INFECTION: ICD-10-CM

## 2025-02-26 PROBLEM — S90.821A BLISTER (NONTHERMAL), RIGHT FOOT, INITIAL ENCOUNTER: Status: ACTIVE | Noted: 2025-02-26

## 2025-02-26 PROCEDURE — OTHER MIPS QUALITY: OTHER

## 2025-02-26 PROCEDURE — OTHER COUNSELING: OTHER

## 2025-02-26 PROCEDURE — OTHER ADDITIONAL NOTES: OTHER

## 2025-02-26 PROCEDURE — 99212 OFFICE O/P EST SF 10 MIN: CPT

## 2025-02-26 ASSESSMENT — LOCATION DETAILED DESCRIPTION DERM: LOCATION DETAILED: RIGHT PLANTAR FOREFOOT OVERLYING 1ST METATARSAL

## 2025-02-26 ASSESSMENT — LOCATION SIMPLE DESCRIPTION DERM: LOCATION SIMPLE: RIGHT PLANTAR SURFACE

## 2025-02-26 ASSESSMENT — LOCATION ZONE DERM: LOCATION ZONE: FEET

## 2025-02-26 NOTE — HPI: SKIN LESION
What Type Of Note Output Would You Prefer (Optional)?: Standard Output
How Severe Is Your Skin Lesion?: moderate
Has Your Skin Lesion Been Treated?: not been treated
Is This A New Presentation, Or A Follow-Up?: Skin Lesion
Additional History: Patient reports a brown spot on the bottom of her right foot that appeared yesterday. Patient used a pumice stone on it last night and states she removed a lot of skin but saw no change or disappearance with the spot. \\nPatient reports that she does yoga often on shared mats but does clean them before and after use. This spot hasn’t grown, darkened or changed since she noticed it.

## 2025-02-26 NOTE — PROCEDURE: ADDITIONAL NOTES
Detail Level: Simple
Additional Notes: Meron Yee PA-C paired down the blister with a 15 blade, followed by Vaseline and a bandage. Patient tolerated well.
Render Risk Assessment In Note?: no

## 2025-04-02 ENCOUNTER — APPOINTMENT (OUTPATIENT)
Dept: URBAN - METROPOLITAN AREA CLINIC 260 | Age: 50
Setting detail: DERMATOLOGY
End: 2025-04-02

## 2025-04-02 VITALS — WEIGHT: 114 LBS | HEIGHT: 64 IN

## 2025-04-02 DIAGNOSIS — L85.3 XEROSIS CUTIS: ICD-10-CM

## 2025-04-02 DIAGNOSIS — L71.8 OTHER ROSACEA: ICD-10-CM

## 2025-04-02 PROCEDURE — 99213 OFFICE O/P EST LOW 20 MIN: CPT

## 2025-04-02 PROCEDURE — OTHER COUNSELING: OTHER

## 2025-04-02 PROCEDURE — OTHER MIPS QUALITY: OTHER

## 2025-04-02 PROCEDURE — OTHER PRESCRIPTION MEDICATION MANAGEMENT: OTHER

## 2025-04-02 PROCEDURE — OTHER ADDITIONAL NOTES: OTHER

## 2025-04-02 ASSESSMENT — LOCATION DETAILED DESCRIPTION DERM
LOCATION DETAILED: LEFT LATERAL INFERIOR EYELID
LOCATION DETAILED: RIGHT LATERAL INFERIOR EYELID
LOCATION DETAILED: RIGHT INFERIOR CENTRAL MALAR CHEEK
LOCATION DETAILED: LEFT INFERIOR CENTRAL MALAR CHEEK

## 2025-04-02 ASSESSMENT — LOCATION SIMPLE DESCRIPTION DERM
LOCATION SIMPLE: LEFT INFERIOR EYELID
LOCATION SIMPLE: RIGHT INFERIOR EYELID
LOCATION SIMPLE: LEFT CHEEK
LOCATION SIMPLE: RIGHT CHEEK

## 2025-04-02 ASSESSMENT — LOCATION ZONE DERM
LOCATION ZONE: FACE
LOCATION ZONE: EYELID

## 2025-04-02 NOTE — PROCEDURE: ADDITIONAL NOTES
Additional Notes: Patient inquired about treatment options to improve the dryness under her eyes. Recommended patient to use a daily moisturizer. Samples of moisturizing creams and ointments given today 
Detail Level: Simple
Render Risk Assessment In Note?: no
Additional Notes: Discussed IPL as an additional treatment option. Brochure on [facility] given today

## 2025-04-02 NOTE — PROCEDURE: PRESCRIPTION MEDICATION MANAGEMENT
Continue Regimen: Metronidazole 0.75% topical cream twice daily. Discussed with patient the importance of being consistent with application of this topical. - No refills needed at this time\\n
Detail Level: Simple
Render In Strict Bullet Format?: No
Plan: Follow up in 6 months